# Patient Record
Sex: MALE | Race: WHITE | NOT HISPANIC OR LATINO | Employment: STUDENT | URBAN - METROPOLITAN AREA
[De-identification: names, ages, dates, MRNs, and addresses within clinical notes are randomized per-mention and may not be internally consistent; named-entity substitution may affect disease eponyms.]

---
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
## 2024-03-18 DIAGNOSIS — Z13.0 SCREENING FOR SICKLE-CELL DISEASE OR TRAIT: ICD-10-CM

## 2024-03-18 DIAGNOSIS — Z13.6 SCREENING FOR HEART DISEASE: Primary | ICD-10-CM

## 2024-03-19 ENCOUNTER — LAB (OUTPATIENT)
Dept: LAB | Facility: CLINIC | Age: 19
End: 2024-03-19
Payer: COMMERCIAL

## 2024-03-19 DIAGNOSIS — Z13.6 SCREENING FOR HEART DISEASE: ICD-10-CM

## 2024-03-19 DIAGNOSIS — Z13.0 SCREENING FOR SICKLE-CELL DISEASE OR TRAIT: ICD-10-CM

## 2024-03-19 PROCEDURE — 83020 HEMOGLOBIN ELECTROPHORESIS: CPT | Performed by: FAMILY MEDICINE

## 2024-03-19 PROCEDURE — 93000 ELECTROCARDIOGRAM COMPLETE: CPT | Performed by: INTERNAL MEDICINE

## 2024-03-19 PROCEDURE — 36415 COLL VENOUS BLD VENIPUNCTURE: CPT | Performed by: PATHOLOGY

## 2024-03-19 PROCEDURE — 99000 SPECIMEN HANDLING OFFICE-LAB: CPT | Performed by: PATHOLOGY

## 2024-03-21 ENCOUNTER — OFFICE VISIT (OUTPATIENT)
Dept: FAMILY MEDICINE | Facility: CLINIC | Age: 19
End: 2024-03-21
Payer: COMMERCIAL

## 2024-03-21 VITALS
HEART RATE: 78 BPM | HEIGHT: 74 IN | BODY MASS INDEX: 19.89 KG/M2 | WEIGHT: 155 LBS | DIASTOLIC BLOOD PRESSURE: 52 MMHG | SYSTOLIC BLOOD PRESSURE: 107 MMHG

## 2024-03-21 DIAGNOSIS — Z02.5 SPORTS PHYSICAL: Primary | ICD-10-CM

## 2024-03-21 LAB
ATRIAL RATE - MUSE: 69 BPM
DIASTOLIC BLOOD PRESSURE - MUSE: NORMAL MMHG
INTERPRETATION ECG - MUSE: NORMAL
P AXIS - MUSE: 58 DEGREES
PR INTERVAL - MUSE: 140 MS
QRS DURATION - MUSE: 98 MS
QT - MUSE: 418 MS
QTC - MUSE: 447 MS
R AXIS - MUSE: 89 DEGREES
SYSTOLIC BLOOD PRESSURE - MUSE: NORMAL MMHG
T AXIS - MUSE: 42 DEGREES
VENTRICULAR RATE- MUSE: 69 BPM

## 2024-03-21 NOTE — PROGRESS NOTES
"Tejinder Hogue    Sport(s): soccer     /52   Pulse 78   Ht 1.88 m (6' 2\")   Wt 70.3 kg (155 lb)   BMI 19.90 kg/m        Vision: Right Eye: 20/15 Left Eye: 20/15 Both Eyes: 20/15  Correction: none  Pupils: equal; pt h/o traumatic iritis 2022.  Saw ophtho.  L pupil slightly larger than left, equally reactive.    Sickle Cell Trait: Discussed and Sickle Cell testing results pending  Concussions: Concussion fact sheet reviewed. Student Athlete gave written and verbal agreement to report any suspected concussions.    General/Medical  Eyes/Vision: Normal  Ears/Hearing: Normal  Nose: Normal  Mouth/Dental: Normal  Throat: Normal  Thyroid: Normal  Lymph Nodes: Normal  Lungs: Normal  Abdomen: Normal  Genitourinary (males only, not performed if female): Normal  Hernia: Normal  Skin: Normal    Musculoskeletal/Orthopaedic  Neck/Cervical: Normal  Thoracic/Lumbar: Normal  Shoulder/Upper Arm: Normal  Elbow/Forearm: Normal  Wrist/Hand/Fingers: Normal  Hip/Thigh: Normal  Knee/Patella: Normal  Lower Leg/Ankles: Normal  Foot/Toes: Normal      EKG clearance  Tejinder Hogue's EKG performed for clearance to participate in intercollegiate athletics at the [facility] has been reviewed on 03/21/24.  Findings are within normal limits for athletic heart.    Sinus rhythm   Normal ECG          Cardiovascular Screening    Heart Murmur:No Grade: NA  Symmetric Femoral pulses: Yes    Stigmata of Marfan's Syndrome - if appropriate:  Not applicable    COMMENTS, RECOMMENDATIONS and PARTICIPATION STATUS  Cleared    "

## 2024-03-21 NOTE — LETTER
"  3/21/2024      RE: Tejinder Hogue  [address] [facility] [id number]     Dear Colleague,    Thank you for referring your patient, Tejinder Hogue, to the  [facility]. Please see a copy of my visit note below.    Tejinder Hogue    Sport(s): soccer     /52   Pulse 78   Ht 1.88 m (6' 2\")   Wt 70.3 kg (155 lb)   BMI 19.90 kg/m        Vision: Right Eye: 20/15 Left Eye: 20/15 Both Eyes: 20/15  Correction: none  Pupils: equal; pt h/o traumatic iritis 2022.  Saw ophtho.  L pupil slightly larger than left, equally reactive.    Sickle Cell Trait: Discussed and Sickle Cell testing results pending  Concussions: Concussion fact sheet reviewed. Student Athlete gave written and verbal agreement to report any suspected concussions.    General/Medical  Eyes/Vision: Normal  Ears/Hearing: Normal  Nose: Normal  Mouth/Dental: Normal  Throat: Normal  Thyroid: Normal  Lymph Nodes: Normal  Lungs: Normal  Abdomen: Normal  Genitourinary (males only, not performed if female): Normal  Hernia: Normal  Skin: Normal    Musculoskeletal/Orthopaedic  Neck/Cervical: Normal  Thoracic/Lumbar: Normal  Shoulder/Upper Arm: Normal  Elbow/Forearm: Normal  Wrist/Hand/Fingers: Normal  Hip/Thigh: Normal  Knee/Patella: Normal  Lower Leg/Ankles: Normal  Foot/Toes: Normal      EKG clearance  Tejinder Hogue's EKG performed for clearance to participate in intercollegiate athletics at the [facility] has been reviewed on 03/21/24.  Findings are within normal limits for athletic heart.    Sinus rhythm   Normal ECG          Cardiovascular Screening    Heart Murmur:No Grade: NA  Symmetric Femoral pulses: Yes    Stigmata of Marfan's Syndrome - if appropriate:  Not applicable    COMMENTS, RECOMMENDATIONS and PARTICIPATION STATUS  Cleared      Again, thank you for allowing me to participate in the care of your patient.      Sincerely,    William Fonseca MD  "

## 2024-03-22 LAB — HGB S BLD QL: NEGATIVE

## 2025-06-16 ENCOUNTER — WALK IN (OUTPATIENT)
Dept: URGENT CARE | Age: 20
End: 2025-06-16

## 2025-06-16 VITALS
DIASTOLIC BLOOD PRESSURE: 65 MMHG | OXYGEN SATURATION: 98 % | TEMPERATURE: 97.5 F | RESPIRATION RATE: 16 BRPM | HEART RATE: 60 BPM | SYSTOLIC BLOOD PRESSURE: 113 MMHG

## 2025-06-16 DIAGNOSIS — M54.9 UPPER BACK PAIN ON LEFT SIDE: Primary | ICD-10-CM

## 2025-06-16 PROCEDURE — 99213 OFFICE O/P EST LOW 20 MIN: CPT | Performed by: PHYSICIAN ASSISTANT

## 2025-06-16 PROCEDURE — 96372 THER/PROPH/DIAG INJ SC/IM: CPT | Performed by: PHYSICIAN ASSISTANT

## 2025-06-16 RX ORDER — TIZANIDINE 2 MG/1
2 TABLET ORAL EVERY 8 HOURS PRN
Qty: 30 TABLET | Refills: 0 | Status: SHIPPED | OUTPATIENT
Start: 2025-06-16

## 2025-06-16 RX ORDER — KETOROLAC TROMETHAMINE 30 MG/ML
30 INJECTION, SOLUTION INTRAMUSCULAR; INTRAVENOUS ONCE
Status: COMPLETED | OUTPATIENT
Start: 2025-06-16 | End: 2025-06-16

## 2025-06-16 RX ADMIN — KETOROLAC TROMETHAMINE 30 MG: 30 INJECTION, SOLUTION INTRAMUSCULAR; INTRAVENOUS at 08:28

## 2025-06-16 ASSESSMENT — ENCOUNTER SYMPTOMS: BACK PAIN: 1
